# Patient Record
Sex: FEMALE | Race: WHITE | NOT HISPANIC OR LATINO | Employment: UNEMPLOYED | URBAN - METROPOLITAN AREA
[De-identification: names, ages, dates, MRNs, and addresses within clinical notes are randomized per-mention and may not be internally consistent; named-entity substitution may affect disease eponyms.]

---

## 2024-08-05 ENCOUNTER — HOSPITAL ENCOUNTER (EMERGENCY)
Facility: MEDICAL CENTER | Age: 44
End: 2024-08-05
Attending: EMERGENCY MEDICINE
Payer: MEDICARE

## 2024-08-05 ENCOUNTER — APPOINTMENT (OUTPATIENT)
Dept: RADIOLOGY | Facility: MEDICAL CENTER | Age: 44
End: 2024-08-05
Attending: EMERGENCY MEDICINE
Payer: MEDICARE

## 2024-08-05 VITALS
RESPIRATION RATE: 19 BRPM | BODY MASS INDEX: 25.69 KG/M2 | DIASTOLIC BLOOD PRESSURE: 74 MMHG | HEART RATE: 85 BPM | WEIGHT: 145 LBS | HEIGHT: 63 IN | OXYGEN SATURATION: 97 % | SYSTOLIC BLOOD PRESSURE: 130 MMHG | TEMPERATURE: 97.8 F

## 2024-08-05 DIAGNOSIS — S09.90XA CLOSED HEAD INJURY, INITIAL ENCOUNTER: ICD-10-CM
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
## 2024-08-05 PROCEDURE — 99283 EMERGENCY DEPT VISIT LOW MDM: CPT

## 2024-08-05 PROCEDURE — 72125 CT NECK SPINE W/O DYE: CPT

## 2024-08-05 PROCEDURE — 70450 CT HEAD/BRAIN W/O DYE: CPT

## 2024-08-05 PROCEDURE — 73610 X-RAY EXAM OF ANKLE: CPT | Mod: RT

## 2024-08-05 PROCEDURE — 73030 X-RAY EXAM OF SHOULDER: CPT | Mod: RT

## 2024-08-05 ASSESSMENT — FIBROSIS 4 INDEX: FIB4 SCORE: 0.29
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
## 2024-08-06 NOTE — ED NOTES
Pt escorted out by security to lobby exit, still yelling and waving arms. Pt ambulated out of the dept w/ steady gait, A&O x4 w/ all belongings
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
## 2024-08-06 NOTE — DISCHARGE INSTRUCTIONS
All of your imaging was negative. Please follow up with your primary care doctor. You can take over the counter medicine for pain

## 2024-08-06 NOTE — ED NOTES
Utilizing :  Upon trying to talk to pt about d/c and being cleared as scans are negative, pt asked where she was going. Pt advised that this RN can assist w/ a ride getting back to [facility]/ shelter. Pt became irate and hostile stating she did not want to go back there, demanding to speak to the doctor about a place to stay. Pt was told that the ERP has nothing to do with a place to stay and offered resources instead, imaging negative she is cleared for d/c.f/u / PCP and OTC for pain. Pt continued to be hostile towards this RN and stated that this RN was lying, pt advised that she was being offered help, given clothes and food, and allowed to rest long after being put up for d/c, but if she is going to be hostile then other measures such as security assistance will be required. Pt states to bring security. RN ended  call and walked out d/t pt hostile and aggressive behavior  Security contacted for assistance

## 2024-08-07 ENCOUNTER — APPOINTMENT (OUTPATIENT)
Dept: RADIOLOGY | Facility: MEDICAL CENTER | Age: 44
End: 2024-08-07
Attending: EMERGENCY MEDICINE
Payer: MEDICARE

## 2024-08-07 ENCOUNTER — PHARMACY VISIT (OUTPATIENT)
Dept: PHARMACY | Facility: MEDICAL CENTER | Age: 44
End: 2024-08-07
Payer: COMMERCIAL

## 2024-08-07 ENCOUNTER — HOSPITAL ENCOUNTER (EMERGENCY)
Facility: MEDICAL CENTER | Age: 44
End: 2024-08-07
Attending: EMERGENCY MEDICINE
Payer: MEDICARE

## 2024-08-07 VITALS
DIASTOLIC BLOOD PRESSURE: 75 MMHG | TEMPERATURE: 97.1 F | BODY MASS INDEX: 25.69 KG/M2 | RESPIRATION RATE: 16 BRPM | HEART RATE: 81 BPM | HEIGHT: 63 IN | WEIGHT: 145 LBS | SYSTOLIC BLOOD PRESSURE: 122 MMHG | OXYGEN SATURATION: 98 %

## 2024-08-07 DIAGNOSIS — S90.30XA CONTUSION OF FOOT, UNSPECIFIED LATERALITY, INITIAL ENCOUNTER: ICD-10-CM

## 2024-08-07 DIAGNOSIS — R73.9 HYPERGLYCEMIA: ICD-10-CM
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
## 2024-08-07 DIAGNOSIS — R19.7 NAUSEA VOMITING AND DIARRHEA: ICD-10-CM

## 2024-08-07 DIAGNOSIS — R11.2 NAUSEA VOMITING AND DIARRHEA: ICD-10-CM

## 2024-08-07 PROCEDURE — RXMED WILLOW AMBULATORY MEDICATION CHARGE: Performed by: EMERGENCY MEDICINE

## 2024-08-07 PROCEDURE — A9270 NON-COVERED ITEM OR SERVICE: HCPCS | Mod: UD | Performed by: EMERGENCY MEDICINE

## 2024-08-07 PROCEDURE — 700102 HCHG RX REV CODE 250 W/ 637 OVERRIDE(OP): Mod: UD | Performed by: EMERGENCY MEDICINE

## 2024-08-07 PROCEDURE — 90471 IMMUNIZATION ADMIN: CPT

## 2024-08-07 PROCEDURE — 90715 TDAP VACCINE 7 YRS/> IM: CPT | Performed by: EMERGENCY MEDICINE

## 2024-08-07 PROCEDURE — 73620 X-RAY EXAM OF FOOT: CPT | Mod: RT

## 2024-08-07 PROCEDURE — 700111 HCHG RX REV CODE 636 W/ 250 OVERRIDE (IP): Performed by: EMERGENCY MEDICINE

## 2024-08-07 PROCEDURE — 99284 EMERGENCY DEPT VISIT MOD MDM: CPT

## 2024-08-07 RX ORDER — CEPHALEXIN 500 MG/1
500 CAPSULE ORAL 4 TIMES DAILY
Qty: 28 CAPSULE | Refills: 0 | Status: ACTIVE | OUTPATIENT
Start: 2024-08-07 | End: 2024-08-14

## 2024-08-07 RX ORDER — ONDANSETRON 4 MG/1
4 TABLET, ORALLY DISINTEGRATING ORAL EVERY 6 HOURS PRN
Qty: 10 TABLET | Refills: 0 | Status: SHIPPED | OUTPATIENT
Start: 2024-08-07

## 2024-08-07 RX ORDER — CEPHALEXIN 500 MG/1
500 CAPSULE ORAL ONCE
Status: COMPLETED | OUTPATIENT
Start: 2024-08-07 | End: 2024-08-07

## 2024-08-07 RX ORDER — NAPROXEN 500 MG/1
500 TABLET ORAL 2 TIMES DAILY WITH MEALS
Qty: 60 TABLET | Refills: 0 | Status: SHIPPED | OUTPATIENT
Start: 2024-08-07 | End: 2024-08-20

## 2024-08-07 RX ADMIN — CEPHALEXIN 500 MG: 500 CAPSULE ORAL at 16:45

## 2024-08-07 RX ADMIN — CLOSTRIDIUM TETANI TOXOID ANTIGEN (FORMALDEHYDE INACTIVATED), CORYNEBACTERIUM DIPHTHERIAE TOXOID ANTIGEN (FORMALDEHYDE INACTIVATED), BORDETELLA PERTUSSIS TOXOID ANTIGEN (GLUTARALDEHYDE INACTIVATED), BORDETELLA PERTUSSIS FILAMENTOUS HEMAGGLUTININ ANTIGEN (FORMALDEHYDE INACTIVATED), BORDETELLA PERTUSSIS PERTACTIN ANTIGEN, AND BORDETELLA PERTUSSIS FIMBRIAE 2/3 ANTIGEN 0.5 ML: 5; 2; 2.5; 5; 3; 5 INJECTION, SUSPENSION INTRAMUSCULAR at 16:36

## 2024-08-07 ASSESSMENT — FIBROSIS 4 INDEX: FIB4 SCORE: 0.29
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
## 2024-08-10 ENCOUNTER — HOSPITAL ENCOUNTER (EMERGENCY)
Facility: MEDICAL CENTER | Age: 44
End: 2024-08-10
Attending: EMERGENCY MEDICINE
Payer: MEDICARE

## 2024-08-10 VITALS
TEMPERATURE: 97 F | HEIGHT: 63 IN | BODY MASS INDEX: 25.69 KG/M2 | WEIGHT: 145 LBS | HEART RATE: 78 BPM | SYSTOLIC BLOOD PRESSURE: 121 MMHG | RESPIRATION RATE: 16 BRPM | OXYGEN SATURATION: 96 % | DIASTOLIC BLOOD PRESSURE: 77 MMHG

## 2024-08-10 DIAGNOSIS — Z59.00 HOMELESS: ICD-10-CM

## 2024-08-10 DIAGNOSIS — F69 BEHAVIOR PROBLEM, ADULT: ICD-10-CM

## 2024-08-10 PROCEDURE — 99283 EMERGENCY DEPT VISIT LOW MDM: CPT

## 2024-08-10 SDOH — ECONOMIC STABILITY - HOUSING INSECURITY: HOMELESSNESS UNSPECIFIED: Z59.00

## 2024-08-10 ASSESSMENT — LIFESTYLE VARIABLES
TOTAL SCORE: 0
HAVE YOU EVER FELT YOU SHOULD CUT DOWN ON YOUR DRINKING: NO
CONSUMPTION TOTAL: INCOMPLETE
DO YOU DRINK ALCOHOL: NO
HAVE PEOPLE ANNOYED YOU BY CRITICIZING YOUR DRINKING: NO
EVER HAD A DRINK FIRST THING IN THE MORNING TO STEADY YOUR NERVES TO GET RID OF A HANGOVER: NO
EVER FELT BAD OR GUILTY ABOUT YOUR DRINKING: NO

## 2024-08-10 ASSESSMENT — FIBROSIS 4 INDEX: FIB4 SCORE: 0.29
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
## 2024-08-12 ENCOUNTER — HOSPITAL ENCOUNTER (EMERGENCY)
Facility: MEDICAL CENTER | Age: 44
End: 2024-08-13
Attending: EMERGENCY MEDICINE
Payer: MEDICARE

## 2024-08-12 DIAGNOSIS — M79.672 PAIN IN BOTH FEET: ICD-10-CM

## 2024-08-12 DIAGNOSIS — M79.671 PAIN IN BOTH FEET: ICD-10-CM

## 2024-08-12 DIAGNOSIS — S90.822A FRICTION BLISTERS OF SOLE OF LEFT FOOT, INITIAL ENCOUNTER: ICD-10-CM

## 2024-08-12 PROCEDURE — 99283 EMERGENCY DEPT VISIT LOW MDM: CPT

## 2024-08-12 PROCEDURE — 700102 HCHG RX REV CODE 250 W/ 637 OVERRIDE(OP): Mod: UD | Performed by: EMERGENCY MEDICINE

## 2024-08-12 PROCEDURE — A9270 NON-COVERED ITEM OR SERVICE: HCPCS | Mod: UD | Performed by: EMERGENCY MEDICINE

## 2024-08-12 PROCEDURE — RXMED WILLOW AMBULATORY MEDICATION CHARGE: Performed by: EMERGENCY MEDICINE

## 2024-08-12 RX ORDER — HYDROCODONE BITARTRATE AND ACETAMINOPHEN 5; 325 MG/1; MG/1
1 TABLET ORAL EVERY 4 HOURS PRN
Qty: 15 TABLET | Refills: 0 | Status: SHIPPED | OUTPATIENT
Start: 2024-08-12 | End: 2024-08-18

## 2024-08-12 RX ORDER — HYDROCODONE BITARTRATE AND ACETAMINOPHEN 5; 325 MG/1; MG/1
1 TABLET ORAL ONCE
Status: COMPLETED | OUTPATIENT
Start: 2024-08-12 | End: 2024-08-12

## 2024-08-12 RX ADMIN — HYDROCODONE BITARTRATE AND ACETAMINOPHEN 1 TABLET: 5; 325 TABLET ORAL at 22:42

## 2024-08-12 ASSESSMENT — FIBROSIS 4 INDEX: FIB4 SCORE: 0.29

## 2024-08-13 ENCOUNTER — PHARMACY VISIT (OUTPATIENT)
Dept: PHARMACY | Facility: MEDICAL CENTER | Age: 44
End: 2024-08-13
Payer: COMMERCIAL

## 2024-08-13 VITALS
DIASTOLIC BLOOD PRESSURE: 75 MMHG | WEIGHT: 130.07 LBS | SYSTOLIC BLOOD PRESSURE: 120 MMHG | HEIGHT: 63 IN | BODY MASS INDEX: 23.05 KG/M2 | OXYGEN SATURATION: 94 % | HEART RATE: 75 BPM | RESPIRATION RATE: 20 BRPM | TEMPERATURE: 97.7 F

## 2024-08-13 ASSESSMENT — PAIN DESCRIPTION - PAIN TYPE: TYPE: ACUTE PAIN

## 2024-08-13 NOTE — ED NOTES
Controlled substance discussed with client. Client agrees to abide by controlled substance contract.

## 2024-08-13 NOTE — ED PROVIDER NOTES
ER Provider Note    Scribed for Isaac Hubbard M.D. by Melany Oacmpo. 8/12/2024   10:26 PM    Primary Care Provider: NARCISO Barber    CHIEF COMPLAINT  Chief Complaint   Patient presents with    Foot Pain     BIB [facility] from [facility]. Complains of bilateral foot pain. Blister on the left foot. Reports wears shoe sometimes.      EXTERNAL RECORDS REVIEWED  Outpatient Notes Review of records show the patient was seen three times this month 08/2024, two times for foot pain and one time most recently for a blister. Patient is hearing impaired and homeless.    HPI/ROS  LIMITATION TO HISTORY   Select: Language ASL, Text Used   OUTSIDE HISTORIAN(S):  None.    Judith Curiel is a 44 y.o. female who presents to the ED for evaluation of foot pain onset a few hours prior to arrival. The patient reports she has bilateral foot pain, with a blister on her left foot. She states that she wears shoes sometimes. She notes the blister appeared prior to obtaining her new shoes one day ago. Denies use of Norco or Percocet prior.    PAST MEDICAL HISTORY  Past Medical History:   Diagnosis Date    Deaf     Diabetes (HCC)        SURGICAL HISTORY  Past Surgical History:   Procedure Laterality Date    NO PERTINENT PAST SURGICAL HISTORY         FAMILY HISTORY  No family history on pertinent for review.    SOCIAL HISTORY   reports that she has been smoking cigarettes. She has never used smokeless tobacco. She reports that she does not drink alcohol and does not use drugs.    CURRENT MEDICATIONS  Previous Medications    CEPHALEXIN (KEFLEX) 500 MG CAP    Take 1 Capsule by mouth 4 times a day for 7 days.    METFORMIN (GLUCOPHAGE) 500 MG TAB    Take 1 Tablet by mouth 2 times a day with meals.    NAPROXEN (NAPROSYN) 500 MG TAB    Take 1 Tablet by mouth 2 times a day with meals.    ONDANSETRON (ZOFRAN ODT) 4 MG TABLET DISPERSIBLE    Take 1 Tablet by mouth every 6 hours as needed for Nausea/Vomiting.       ALLERGIES  No Known Allergies  "    PHYSICAL EXAM  /84   Pulse 90   Temp 36.4 °C (97.5 °F) (Temporal)   Resp 18   Ht 1.6 m (5' 3\")   Wt 59 kg (130 lb 1.1 oz)   LMP 08/05/2024 (Approximate)   SpO2 100%   BMI 23.04 kg/m²    Nursing note and vitals reviewed.  Constitutional: Well-developed and well-nourished. No distress.   HENT: Head is normocephalic and atraumatic. Oropharynx is clear and moist without exudate or erythema.   Eyes: Pupils are equal, round, and reactive to light. Conjunctiva are normal.   Cardiovascular: Normal rate and regular rhythm. No murmur heard. Normal radial pulses.  Pulmonary/Chest: Breath sounds normal. No wheezes or rales.   Abdominal: Soft and non-tender. No distention    Musculoskeletal: Extremities exhibit normal range of motion without edema or tenderness.   Neurological: Awake, alert and oriented to person, place, and time. No focal deficits noted.  Skin: Skin is warm and dry. No rash. Friction blister on arch of left foot. No evidence of infection.  Psychiatric: Normal mood and affect. Appropriate for clinical situation    INITIAL ASSESSMENT AND PLAN    10:26 PM - Patient was evaluated at bedside. The patient will be medicated with Norco 5-325 mg per 1 tablet for her symptoms. Patient verbalizes understanding and support with my plan of care.         COURSE AND MEDICAL DECISION MAKING    10:46 PM -  I reevaluated the patient at bedside. The patient informs me they feel improved following Norco administration. I discussed plan for discharge and follow up as outlined below. The patient is stable for discharge at this time and will return for any new or worsening symptoms. Patient verbalizes understanding and support with my plan for discharge.     DISPOSITION AND DISCUSSIONS    I have discussed management of the patient with the following physicians and FLORIDALMA's:  None.    Discussion of management with other QHP or appropriate source(s): None     Barriers to care at this time, including but not limited to: " Patient is homeless.     I considered prescribing antibiotics, however I have not identified a bacterial source of infection.    I reviewed prescription monitoring program for patient's narcotic use before prescribing a scheduled drug.The patient will not drink alcohol nor drive with prescribed medications. The patient will return for new or worsening symptoms and is stable at the time of discharge.    The patient is referred to a primary physician for blood pressure management, diabetic screening, and for all other preventative health concerns.    In prescribing controlled substances to this patient, I certify that I have obtained and reviewed the medical history of Judith Curiel. I have also made a good grupo effort to obtain applicable records from other providers who have treated the patient and records did not demonstrate any increased risk of substance abuse that would prevent me from prescribing controlled substances.     I have conducted a physical exam and documented it. I have reviewed Ms. Curiel’s prescription history as maintained by the [place] Prescription Monitoring Program.     I have assessed the patient’s risk for abuse, dependency, and addiction using the validated Opioid Risk Tool available at https://www.mdcalc.com/zpyuyf-kbee-arrm-ort-narcotic-abuse.     Given the above, I believe the benefits of controlled substance therapy outweigh the risks. The reasons for prescribing controlled substances include non-narcotic, oral analgesic alternatives have been inadequate for pain control. Accordingly, I have discussed the risk and benefits, treatment plan, and alternative therapies with the patient.     DISPOSITION:  Patient will be discharged home in stable condition.    FOLLOW UP:  NARCISO Barber  [address]  [facility] [address]  [phone]    Schedule an appointment as soon as possible for a visit       [facility], Emergency Dept  [address]  [place] [address]  [phone]    If symptoms worsen      OUTPATIENT MEDICATIONS:  New Prescriptions    HYDROCODONE-ACETAMINOPHEN (NORCO) 5-325 MG TAB PER TABLET    Take 1 Tablet by mouth every four hours as needed (pain) for up to 5 days.     FINAL DIAGNOSIS  1. Pain in both feet    2. Friction blisters of sole of left foot, initial encounter       Melany BOSE ([place]), am scribing for, and in the presence of, Isaac Hubbard M.D..    Electronically signed by: Melany Ocampo (Scribe), 8/12/2024    IIsaac M.D. personally performed the services described in this documentation, as scribed by Melany Ocampo in my presence, and it is both accurate and complete.      The note accurately reflects work and decisions made by me.  Isaac Hubbard M.D.  8/12/2024  11:36 PM

## 2024-08-13 NOTE — ED TRIAGE NOTES
Judith Curiel  44 y.o. female  Chief Complaint   Patient presents with    Foot Pain     BIB [facility] from [facility]. Complains of bilateral foot pain. Blister on the left foot. Reports wears shoe sometimes.      Pt ambulatory to triage with steady gait for above complaint. Patient is deaf. Able to communicate thrhu American sign language or texting.    Pt is GCS 15, follows commands and responds appropriately to questions. Resp are even and unlabored.     Pt placed in ED lobby. Pt educated on triage process. Pt encouraged to alert staff for any changes.       Vitals:    08/12/24 2111   BP: 126/84   Pulse: 90   Resp: 18   Temp: 36.4 °C (97.5 °F)   SpO2: 100%      [Mother] : mother

## 2024-08-13 NOTE — ED NOTES
This RN booked a ride for pt via [facility].    Discharge instructions given and discussed, signed copy in chart. Pt verbalized understanding and all questions answered. Copy of prescriptions given to pt. Pt discharged in stable condition on room air. Personal belongings given to patient.

## 2024-08-14 ENCOUNTER — HOSPITAL ENCOUNTER (EMERGENCY)
Facility: MEDICAL CENTER | Age: 44
End: 2024-08-14
Attending: EMERGENCY MEDICINE
Payer: MEDICARE

## 2024-08-14 VITALS
HEART RATE: 85 BPM | SYSTOLIC BLOOD PRESSURE: 126 MMHG | TEMPERATURE: 97.7 F | DIASTOLIC BLOOD PRESSURE: 81 MMHG | HEIGHT: 63 IN | BODY MASS INDEX: 22.73 KG/M2 | RESPIRATION RATE: 20 BRPM | WEIGHT: 128.31 LBS | OXYGEN SATURATION: 98 %

## 2024-08-14 DIAGNOSIS — T14.8XXA BLISTER: ICD-10-CM

## 2024-08-14 PROCEDURE — 97597 DBRDMT OPN WND 1ST 20 CM/<: CPT | Mod: XU

## 2024-08-14 PROCEDURE — 99283 EMERGENCY DEPT VISIT LOW MDM: CPT

## 2024-08-14 PROCEDURE — 99406 BEHAV CHNG SMOKING 3-10 MIN: CPT

## 2024-08-14 PROCEDURE — 303977 HCHG I & D

## 2024-08-14 ASSESSMENT — PAIN DESCRIPTION - DESCRIPTORS: DESCRIPTORS: BURNING

## 2024-08-14 ASSESSMENT — PAIN DESCRIPTION - PAIN TYPE: TYPE: ACUTE PAIN

## 2024-08-14 ASSESSMENT — FIBROSIS 4 INDEX: FIB4 SCORE: 0.29

## 2024-08-14 NOTE — ED PROVIDER NOTES
"ED Provider Note    CHIEF COMPLAINT  Chief Complaint   Patient presents with    Foot Pain     blisters       HPI/ROS      Judith Curiel is a 44 y.o. female who presents with a blister to the left inner aspect of her heel.  The patient's had this for several days.  She also has an open wound to the right foot that seems to be healing.  She is currently on Keflex.  She is homeless.  She also admits to tobacco abuse as well as diabetes.    PAST MEDICAL HISTORY   has a past medical history of Deaf and Diabetes (HCC).    SURGICAL HISTORY   has a past surgical history that includes no pertinent past surgical history.    FAMILY HISTORY  History reviewed. No pertinent family history.    SOCIAL HISTORY  Social History     Tobacco Use    Smoking status: Every Day     Current packs/day: 1.00     Types: Cigarettes    Smokeless tobacco: Never   Vaping Use    Vaping status: Never Used   Substance and Sexual Activity    Alcohol use: No    Drug use: Yes     Comment: meth last use june 2024    Sexual activity: Not on file       CURRENT MEDICATIONS  Home Medications    **Home medications have not yet been reviewed for this encounter**         ALLERGIES  No Known Allergies    PHYSICAL EXAM  VITAL SIGNS: /83   Pulse 100   Temp 36.7 °C (98 °F) (Temporal)   Resp 20   Ht 1.6 m (5' 3\")   Wt 58.2 kg (128 lb 4.9 oz)   LMP 08/05/2024 (Approximate)   SpO2 95%   BMI 22.73 kg/m²    In general the patient is unkempt but does not appear toxic    Extremities patient does have a blister that extends from the bottom of the calcaneus medially with no significant surrounding erythema nor induration.  She also has a small open wound to the right foot from a prior blister with no surrounding erythema nor induration    Skin as mentioned above    Neuro vas examination is grossly intact to the lower extremities      PROCEDURE left foot blister debridement  Secondary to location of the blister as well as the size we did opt for " debridement as would cause continued pressure and expand with ambulation.  Therefore I used a #11 blade initially to incise into the blister with serous drainage.  I then removed the overlying skin with scissors.  Antibiotic ointment and a dressing was applied.      COURSE & MEDICAL DECISION MAKING    This a 44-year-old female who presents with a large blister to the left foot.  This was drained and debrided.  Secondary to the size as mentioned above the patient have continued expansion with ambulation.  Antibiotic ointment and a dressing was applied and the patient will receive supplies for dressing changes every 24 hours for the next 2 days.  I like the patient to recheck with her primary care doctor or the emergency department in 48 hours.  She is currently on Keflex.    I did have a good discussion with the patient regarding the need for smoking cessation.      FINAL DIAGNOSIS  1.  Blister left foot with debridement  2. Jose Whelan M.D. spent greater than 3 minutes with the patient explaining the importance of smoking cessation.   3.  Homelessness    Disposition  The patient will be discharged in stable condition     Electronically signed by: Jose Whelan M.D., 8/14/2024 5:35 AM

## 2024-08-14 NOTE — ED NOTES
This RN called [facility] to notify pt will be returning and spoke with [facility] staff confirmed bed availability for pt

## 2024-08-14 NOTE — ED TRIAGE NOTES
"Chief Complaint   Patient presents with    Foot Pain     blisters     Pt arrives with complaint of bilateral feet pain x 1 week. Pt is normally ambulatory and has developed blisters. Right back of heel small abrasion noted with redness. Left foot with large fluid filled blister with redness noted. PMS intact to BLE. Pt aox4, skin warm and dry, airway patent, rr even and unlabored. Interpretor # [id number] used for american sign language.    /83   Pulse 100   Temp 36.7 °C (98 °F) (Temporal)   Resp 20   Ht 1.6 m (5' 3\")   Wt 58.2 kg (128 lb 4.9 oz)   LMP 08/05/2024 (Approximate)   SpO2 95%   BMI 22.73 kg/m²     Pt updated on rooming process and to notify staff for worsening condition. Pt sent back to lobby until a room is available.    "

## 2024-08-14 NOTE — DISCHARGE INSTRUCTIONS
Continue the antibiotics.  Recheck with your primary care doctor here in the Emergency Department in 48 to 72 hours.  Change the dressing daily as mentioned.    Refrain from further tobacco abuse.

## 2024-08-14 NOTE — ED NOTES
This RN booked pt's ride back to [facility] via [facility]. Pt transported to [facility] via

## 2024-08-14 NOTE — ED NOTES
Discharge instructions given and discussed, signed copy in chart. Pt verbalized understanding and all questions answered. Pt discharged in stable condition on room air. Personal belongings given to patient.

## 2024-08-20 ENCOUNTER — HOSPITAL ENCOUNTER (EMERGENCY)
Facility: MEDICAL CENTER | Age: 44
End: 2024-08-21
Attending: EMERGENCY MEDICINE
Payer: MEDICARE

## 2024-08-20 VITALS
SYSTOLIC BLOOD PRESSURE: 136 MMHG | HEART RATE: 90 BPM | WEIGHT: 128 LBS | OXYGEN SATURATION: 99 % | RESPIRATION RATE: 19 BRPM | TEMPERATURE: 98.5 F | BODY MASS INDEX: 22.68 KG/M2 | HEIGHT: 63 IN | DIASTOLIC BLOOD PRESSURE: 65 MMHG
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
## 2024-08-20 DIAGNOSIS — T14.8XXA CHRONIC WOUND: ICD-10-CM

## 2024-08-20 DIAGNOSIS — R73.9 HYPERGLYCEMIA: ICD-10-CM

## 2024-08-20 LAB
ANION GAP SERPL CALC-SCNC: 14 MMOL/L (ref 7–16)
BASOPHILS # BLD AUTO: 0.1 % (ref 0–1.8)
BASOPHILS # BLD: 0.01 K/UL (ref 0–0.12)
BUN SERPL-MCNC: 15 MG/DL (ref 8–22)
CALCIUM SERPL-MCNC: 8.8 MG/DL (ref 8.5–10.5)
CHLORIDE SERPL-SCNC: 100 MMOL/L (ref 96–112)
CO2 SERPL-SCNC: 20 MMOL/L (ref 20–33)
CREAT SERPL-MCNC: 0.87 MG/DL (ref 0.5–1.4)
EOSINOPHIL # BLD AUTO: 0.06 K/UL (ref 0–0.51)
EOSINOPHIL NFR BLD: 0.8 % (ref 0–6.9)
ERYTHROCYTE [DISTWIDTH] IN BLOOD BY AUTOMATED COUNT: 39.6 FL (ref 35.9–50)
GFR SERPLBLD CREATININE-BSD FMLA CKD-EPI: 85 ML/MIN/1.73 M 2
GLUCOSE SERPL-MCNC: 349 MG/DL (ref 65–99)
HCT VFR BLD AUTO: 35.6 % (ref 37–47)
HGB BLD-MCNC: 11.6 G/DL (ref 12–16)
IMM GRANULOCYTES # BLD AUTO: 0.02 K/UL (ref 0–0.11)
IMM GRANULOCYTES NFR BLD AUTO: 0.3 % (ref 0–0.9)
LYMPHOCYTES # BLD AUTO: 1.45 K/UL (ref 1–4.8)
LYMPHOCYTES NFR BLD: 20 % (ref 22–41)
MCH RBC QN AUTO: 27.4 PG (ref 27–33)
MCHC RBC AUTO-ENTMCNC: 32.6 G/DL (ref 32.2–35.5)
MCV RBC AUTO: 84 FL (ref 81.4–97.8)
MONOCYTES # BLD AUTO: 0.5 K/UL (ref 0–0.85)
MONOCYTES NFR BLD AUTO: 6.9 % (ref 0–13.4)
NEUTROPHILS # BLD AUTO: 5.21 K/UL (ref 1.82–7.42)
NEUTROPHILS NFR BLD: 71.9 % (ref 44–72)
NRBC # BLD AUTO: 0 K/UL
NRBC BLD-RTO: 0 /100 WBC (ref 0–0.2)
PLATELET # BLD AUTO: 272 K/UL (ref 164–446)
PMV BLD AUTO: 11.6 FL (ref 9–12.9)
POTASSIUM SERPL-SCNC: 3.8 MMOL/L (ref 3.6–5.5)
RBC # BLD AUTO: 4.24 M/UL (ref 4.2–5.4)
SODIUM SERPL-SCNC: 134 MMOL/L (ref 135–145)
WBC # BLD AUTO: 7.3 K/UL (ref 4.8–10.8)

## 2024-08-20 PROCEDURE — 99284 EMERGENCY DEPT VISIT MOD MDM: CPT

## 2024-08-20 PROCEDURE — 700105 HCHG RX REV CODE 258: Mod: UD | Performed by: EMERGENCY MEDICINE

## 2024-08-20 PROCEDURE — 700102 HCHG RX REV CODE 250 W/ 637 OVERRIDE(OP): Mod: UD | Performed by: EMERGENCY MEDICINE

## 2024-08-20 PROCEDURE — A9270 NON-COVERED ITEM OR SERVICE: HCPCS | Mod: UD | Performed by: EMERGENCY MEDICINE

## 2024-08-20 PROCEDURE — 36415 COLL VENOUS BLD VENIPUNCTURE: CPT

## 2024-08-20 PROCEDURE — 85025 COMPLETE CBC W/AUTO DIFF WBC: CPT

## 2024-08-20 PROCEDURE — 80048 BASIC METABOLIC PNL TOTAL CA: CPT

## 2024-08-20 RX ORDER — SODIUM CHLORIDE, SODIUM LACTATE, POTASSIUM CHLORIDE, CALCIUM CHLORIDE 600; 310; 30; 20 MG/100ML; MG/100ML; MG/100ML; MG/100ML
1000 INJECTION, SOLUTION INTRAVENOUS ONCE
Status: COMPLETED | OUTPATIENT
Start: 2024-08-20 | End: 2024-08-20

## 2024-08-20 RX ORDER — ACETAMINOPHEN 500 MG
1000 TABLET ORAL ONCE
Status: COMPLETED | OUTPATIENT
Start: 2024-08-20 | End: 2024-08-20

## 2024-08-20 RX ORDER — NAPROXEN 500 MG/1
500 TABLET ORAL 2 TIMES DAILY WITH MEALS
Qty: 60 TABLET | Refills: 1 | Status: SHIPPED | OUTPATIENT
Start: 2024-08-20

## 2024-08-20 RX ADMIN — IBUPROFEN 800 MG: 200 TABLET, FILM COATED ORAL at 22:07

## 2024-08-20 RX ADMIN — SODIUM CHLORIDE, POTASSIUM CHLORIDE, SODIUM LACTATE AND CALCIUM CHLORIDE 1000 ML: 600; 310; 30; 20 INJECTION, SOLUTION INTRAVENOUS at 22:10

## 2024-08-20 RX ADMIN — ACETAMINOPHEN 1000 MG: 500 TABLET ORAL at 22:07

## 2024-08-20 ASSESSMENT — FIBROSIS 4 INDEX: FIB4 SCORE: 0.29

## 2024-08-21 ENCOUNTER — PHARMACY VISIT (OUTPATIENT)
Dept: PHARMACY | Facility: MEDICAL CENTER | Age: 44
End: 2024-08-21
Payer: COMMERCIAL

## 2024-08-21 PROCEDURE — RXMED WILLOW AMBULATORY MEDICATION CHARGE: Performed by: EMERGENCY MEDICINE

## 2024-08-21 NOTE — ED NOTES
Pt discharged to home. Pt was given follow up instructions and prescriptions. Pt verbalized understanding of all instructions for discharge and is ambulatory out of ED with steady gait. AOx4
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
## 2024-08-21 NOTE — DISCHARGE INSTRUCTIONS
Your prescriptions were sent upstairs for pain and for your diabetes   There is not reason for admission or antibiotics today
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
## 2024-08-24 ENCOUNTER — HOSPITAL ENCOUNTER (EMERGENCY)
Facility: MEDICAL CENTER | Age: 44
End: 2024-08-24
Attending: EMERGENCY MEDICINE
Payer: MEDICARE

## 2024-08-24 VITALS
HEART RATE: 78 BPM | RESPIRATION RATE: 18 BRPM | SYSTOLIC BLOOD PRESSURE: 118 MMHG | DIASTOLIC BLOOD PRESSURE: 69 MMHG | WEIGHT: 128 LBS | TEMPERATURE: 98.4 F | BODY MASS INDEX: 22.68 KG/M2 | OXYGEN SATURATION: 99 % | HEIGHT: 63 IN

## 2024-08-24 DIAGNOSIS — Z51.89 ENCOUNTER FOR WOUND RE-CHECK: ICD-10-CM

## 2024-08-24 PROCEDURE — 700101 HCHG RX REV CODE 250: Mod: UD | Performed by: EMERGENCY MEDICINE

## 2024-08-24 PROCEDURE — 99283 EMERGENCY DEPT VISIT LOW MDM: CPT

## 2024-08-24 RX ORDER — MUPIROCIN 20 MG/G
OINTMENT TOPICAL 2 TIMES DAILY
Status: DISCONTINUED | OUTPATIENT
Start: 2024-08-24 | End: 2024-08-24 | Stop reason: HOSPADM

## 2024-08-24 RX ORDER — MUPIROCIN 20 MG/G
1 OINTMENT TOPICAL 2 TIMES DAILY
Qty: 22 G | Refills: 0 | Status: SHIPPED | OUTPATIENT
Start: 2024-08-24

## 2024-08-24 RX ADMIN — MUPIROCIN 2 %: 20 OINTMENT TOPICAL at 18:00

## 2024-08-24 ASSESSMENT — FIBROSIS 4 INDEX: FIB4 SCORE: 0.32

## 2024-08-24 NOTE — ED TRIAGE NOTES
Chief Complaint   Patient presents with    Wound Check     Pt bib [facility]sa from [facility] reports wound to left foot x 2 weeks. States pain worsen today. Has prescription of naproxen for pain but ems states that still has full bottle. Denies fever, chills. Pt hard of hearing. Able to read lip. Has bgl of 284
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
## 2024-08-25 NOTE — ED NOTES
Pt discharged home, discharge and follow up care instructions given, prescriptions sent to the pharmacy of choice, pt verbalized understanding.  pt ambulated out of ED independently.

## 2024-08-27 ENCOUNTER — PHARMACY VISIT (OUTPATIENT)
Dept: PHARMACY | Facility: MEDICAL CENTER | Age: 44
End: 2024-08-27
Payer: COMMERCIAL

## 2024-08-27 ENCOUNTER — HOSPITAL ENCOUNTER (EMERGENCY)
Facility: MEDICAL CENTER | Age: 44
End: 2024-08-27
Attending: EMERGENCY MEDICINE
Payer: MEDICARE

## 2024-08-27 ENCOUNTER — APPOINTMENT (OUTPATIENT)
Dept: RADIOLOGY | Facility: MEDICAL CENTER | Age: 44
End: 2024-08-27
Attending: EMERGENCY MEDICINE
Payer: MEDICARE

## 2024-08-27 VITALS
BODY MASS INDEX: 22.7 KG/M2 | RESPIRATION RATE: 16 BRPM | SYSTOLIC BLOOD PRESSURE: 123 MMHG | WEIGHT: 128.09 LBS | HEART RATE: 72 BPM | DIASTOLIC BLOOD PRESSURE: 70 MMHG | TEMPERATURE: 98 F | HEIGHT: 63 IN | OXYGEN SATURATION: 98 %

## 2024-08-27 DIAGNOSIS — L97.529 ULCER OF LEFT FOOT, UNSPECIFIED ULCER STAGE (HCC): ICD-10-CM

## 2024-08-27 DIAGNOSIS — L03.116 CELLULITIS OF LEFT FOOT: ICD-10-CM

## 2024-08-27 PROCEDURE — RXMED WILLOW AMBULATORY MEDICATION CHARGE: Performed by: EMERGENCY MEDICINE

## 2024-08-27 PROCEDURE — 99284 EMERGENCY DEPT VISIT MOD MDM: CPT

## 2024-08-27 PROCEDURE — A9270 NON-COVERED ITEM OR SERVICE: HCPCS | Mod: UD | Performed by: EMERGENCY MEDICINE

## 2024-08-27 PROCEDURE — 700102 HCHG RX REV CODE 250 W/ 637 OVERRIDE(OP): Mod: UD | Performed by: EMERGENCY MEDICINE

## 2024-08-27 PROCEDURE — 73630 X-RAY EXAM OF FOOT: CPT | Mod: LT

## 2024-08-27 RX ORDER — CEPHALEXIN 500 MG/1
1000 CAPSULE ORAL 2 TIMES DAILY
Qty: 28 CAPSULE | Refills: 0 | Status: ACTIVE | OUTPATIENT
Start: 2024-08-27 | End: 2024-09-03

## 2024-08-27 RX ORDER — OXYCODONE HYDROCHLORIDE 5 MG/1
5 TABLET ORAL ONCE
Status: COMPLETED | OUTPATIENT
Start: 2024-08-27 | End: 2024-08-27

## 2024-08-27 RX ORDER — SULFAMETHOXAZOLE/TRIMETHOPRIM 800-160 MG
1 TABLET ORAL 2 TIMES DAILY
Qty: 14 TABLET | Refills: 0 | Status: ACTIVE | OUTPATIENT
Start: 2024-08-27 | End: 2024-09-03

## 2024-08-27 RX ORDER — SULFAMETHOXAZOLE/TRIMETHOPRIM 800-160 MG
1 TABLET ORAL ONCE
Status: COMPLETED | OUTPATIENT
Start: 2024-08-27 | End: 2024-08-27

## 2024-08-27 RX ORDER — CEPHALEXIN 500 MG/1
500 CAPSULE ORAL ONCE
Status: COMPLETED | OUTPATIENT
Start: 2024-08-27 | End: 2024-08-27

## 2024-08-27 RX ORDER — ACETAMINOPHEN 500 MG
1000 TABLET ORAL ONCE
Status: COMPLETED | OUTPATIENT
Start: 2024-08-27 | End: 2024-08-27

## 2024-08-27 RX ADMIN — ACETAMINOPHEN 1000 MG: 500 TABLET ORAL at 17:09

## 2024-08-27 RX ADMIN — SULFAMETHOXAZOLE AND TRIMETHOPRIM 1 TABLET: 800; 160 TABLET ORAL at 17:09

## 2024-08-27 RX ADMIN — CEPHALEXIN 500 MG: 500 CAPSULE ORAL at 17:09

## 2024-08-27 RX ADMIN — OXYCODONE HYDROCHLORIDE 5 MG: 5 TABLET ORAL at 17:09

## 2024-08-27 ASSESSMENT — FIBROSIS 4 INDEX: FIB4 SCORE: 0.32

## 2024-08-27 NOTE — ED NOTES
Pt brought back to room [address] via wheelchair. C/C is Wound Check on her Left Foot. Pt is on the monitor and call light within reach. Chart up for ERP.

## 2024-08-27 NOTE — ED NOTES
RN agree with triage. Wound to L foot, no drainage or blood noted. Appears to be a blister to R foot.  On monitor. Call light in reach. Denies fevers

## 2024-08-27 NOTE — ED TRIAGE NOTES
Chief Complaint   Patient presents with    Wound Check     Blister/wound to side of left foot. Patient reports she was walking today when it opened and started bleeding. Patient has a closed wound to right foot also.

## 2024-08-27 NOTE — ED PROVIDER NOTES
ED Provider Note    CHIEF COMPLAINT  Chief Complaint   Patient presents with    Wound Check     Blister/wound to side of left foot. Patient reports she was walking today when it opened and started bleeding. Patient has a closed wound to right foot also.        EXTERNAL RECORDS REVIEWED  None    HPI/ROS  LIMITATION TO HISTORY   Select: Language ASL,  Used   OUTSIDE HISTORIAN(S):  None      Judith Curiel is a 44 y.o. female who presents to the ER for 1 week of worsening left heel redness, pain, drainage.  No fevers or chills.  She is still able to walk on it.  She has not sure how this started.  She has been taking naproxen for the pain which is only mildly helping.  Does have a history of diabetes    PAST MEDICAL HISTORY   has a past medical history of Deaf and Diabetes (HCC).    SURGICAL HISTORY   has a past surgical history that includes no pertinent past surgical history.    FAMILY HISTORY  History reviewed. No pertinent family history.    SOCIAL HISTORY  Social History     Tobacco Use    Smoking status: Every Day     Current packs/day: 1.00     Types: Cigarettes    Smokeless tobacco: Never   Vaping Use    Vaping status: Never Used   Substance and Sexual Activity    Alcohol use: No    Drug use: Yes     Comment: meth last use june 2024    Sexual activity: Not on file       CURRENT MEDICATIONS  Home Medications       Reviewed by Salma Smith R.N. (Registered Nurse) on 08/27/24 at 1549  Med List Status: Partial     Medication Last Dose Status   metFORMIN (GLUCOPHAGE) 500 MG Tab  Active   mupirocin (BACTROBAN) 2 % Ointment  Active   naproxen (NAPROSYN) 500 MG Tab  Active   ondansetron (ZOFRAN ODT) 4 MG TABLET DISPERSIBLE  Active                  Audit from Redirected Encounters    **Home medications have not yet been reviewed for this encounter**         ALLERGIES  No Known Allergies    PHYSICAL EXAM  VITAL SIGNS: /80   Pulse 90   Temp 36.6 °C (97.8 °F) (Temporal)   Resp 18   Ht 1.6 m (5'  "3\")   Wt 58.1 kg (128 lb 1.4 oz)   LMP 08/05/2024 (Approximate)   SpO2 99%   BMI 22.69 kg/m²    General: Sitting in stretcher comfortably, no distress  CV: Regular rate and rhythm no murmurs  Pulmonary: Breathing comfortably on room air  Abdomen: Soft nondistended nontender  MSK: Ulceration to the left medial heel with surrounding erythema and serous fluid within a blister but no purulence.  Additional flaccid bullae to the left posterior distal leg without any surrounding erythema, no tenderness        RADIOLOGY/PROCEDURES   I have independently interpreted the diagnostic imaging associated with this visit and am waiting the final reading from the radiologist.   My preliminary interpretation is as follows: No evidence of osteomyelitis    Radiologist interpretation:  DX-FOOT-COMPLETE 3+ LEFT   Final Result         1.  No radiographic evidence of acute injury.          COURSE & MEDICAL DECISION MAKING    ASSESSMENT, COURSE AND PLAN  Care Narrative: Differential includes diabetic foot ulcer, osteomyelitis, abscess, cellulitis, foreign body    Vitals on arrival all within normal limits, no evidence of sepsis.  She has an ulcer on the left medial heel surrounded by erythema with some serous fluid underneath the blister and also has a flaccid serous bullae on the right posterior leg.  No evidence of sepsis.  Differential above considered.  We will give cephalexin and Bactrim prophylactically, treat with Tylenol and oxycodone for pain.  X-rays were obtained which fortunately showed no osseous involvement.  I suspect she has a diabetic foot ulcer and early cellulitis which should be amenable to outpatient management to start.  I gave her a 1 week prescription of cephalexin and Bactrim and referred her to the wound clinic.  Return precautions and home care were provided and she was discharged home in stable condition.          ADDITIONAL PROBLEMS MANAGED  None    DISPOSITION AND DISCUSSIONS  I have discussed management " of the patient with the following physicians and FLORIDALMA's: None    Discussion of management with other QHP or appropriate source(s): None     Escalation of care considered, and ultimately not performed:Laboratory analysis    Barriers to care at this time, including but not limited to: Patient is homeless.     Decision tools and prescription drugs considered including, but not limited to: Antibiotics cephalexin and Bactrim .    FINAL DIAGNOSIS  1. Ulcer of left foot, unspecified ulcer stage (HCC)    2. Cellulitis of left foot         Electronically signed by: Oliver Decker M.D., 8/27/2024 4:48 PM

## 2024-08-28 NOTE — DISCHARGE INSTRUCTIONS
Start taking your antibiotics tomorrow and finish them completely.  Follow-up with the wound clinic to make sure your wound heals appropriately.  Come back if you think the infection is getting worse.

## 2024-08-28 NOTE — ED NOTES
Discharge education provided. Discharge paperwork reviewed with pt. Prescription picked up and provided to patient. All questions answered. All belongings with pt. Pt ambulated to lobby unassisted with steady gait for [facility] transport home.      used for all interactions.

## 2024-09-19 ENCOUNTER — OFFICE VISIT (OUTPATIENT)
Dept: WOUND CARE | Facility: MEDICAL CENTER | Age: 44
End: 2024-09-19
Attending: NURSE PRACTITIONER
Payer: MEDICARE

## 2024-09-19 VITALS
HEART RATE: 82 BPM | SYSTOLIC BLOOD PRESSURE: 96 MMHG | RESPIRATION RATE: 16 BRPM | TEMPERATURE: 97.2 F | DIASTOLIC BLOOD PRESSURE: 70 MMHG

## 2024-09-19 DIAGNOSIS — E11.621 TYPE 2 DIABETES MELLITUS WITH FOOT ULCER, WITHOUT LONG-TERM CURRENT USE OF INSULIN (HCC): ICD-10-CM

## 2024-09-19 DIAGNOSIS — L97.412 DIABETIC ULCER OF RIGHT HEEL ASSOCIATED WITH TYPE 2 DIABETES MELLITUS, WITH FAT LAYER EXPOSED (HCC): ICD-10-CM

## 2024-09-19 DIAGNOSIS — L97.509 TYPE 2 DIABETES MELLITUS WITH FOOT ULCER, WITHOUT LONG-TERM CURRENT USE OF INSULIN (HCC): ICD-10-CM

## 2024-09-19 DIAGNOSIS — E11.621 DIABETIC ULCER OF RIGHT HEEL ASSOCIATED WITH TYPE 2 DIABETES MELLITUS, WITH FAT LAYER EXPOSED (HCC): ICD-10-CM

## 2024-09-19 DIAGNOSIS — F17.210 CIGARETTE NICOTINE DEPENDENCE WITHOUT COMPLICATION: ICD-10-CM

## 2024-09-19 PROCEDURE — 11042 DBRDMT SUBQ TIS 1ST 20SQCM/<: CPT

## 2024-09-19 PROCEDURE — 3078F DIAST BP <80 MM HG: CPT | Performed by: NURSE PRACTITIONER

## 2024-09-19 PROCEDURE — 11042 DBRDMT SUBQ TIS 1ST 20SQCM/<: CPT | Performed by: NURSE PRACTITIONER

## 2024-09-19 PROCEDURE — 3074F SYST BP LT 130 MM HG: CPT | Performed by: NURSE PRACTITIONER

## 2024-09-19 PROCEDURE — 99214 OFFICE O/P EST MOD 30 MIN: CPT

## 2024-09-19 NOTE — PROGRESS NOTES
Provider Encounter- Diabetic Foot Ulcer      HISTORY OF PRESENT ILLNESS  Wound History:    START OF CARE IN CLINIC: 9/19/2024    REFERRING PROVIDER: Vera Quijano     WOUND- Diabetic foot ulcer   LOCATION: Left plantar heel     HISTORY: Patient developed blisters to her left foot following ill fitting shoes and excessive walking beginning of August 2024.  She followed up with the ED.  X-ray was negative for osteomyelitis.  She was started on Keflex and Bactrim DS antibiotics for 1 week which patient completed.  She was referred to [facility] wound care clinic for further treatment and care.  Of note patient has followed up with [facility] ED 9 times since 7/14/2024.    Pertinent Medical History: Deafness, diabetes, homelessness    DIABETES HX: Diagnosed with type 2 diabetes in 2020, and is currently managing diabetes with orals.    Checks blood sugars: 1x/day   Blood sugar average: 150-250  Has had previous diabetes education.    Does not have numbness in feet.    Foot wear:nonprescriptive shoes.  Does check feet routinely.    Previous foot ulcers: Yes  Previous foot surgery:no  Current occupation not working.    Offloading none.           TOBACCO USE:   1 pack/day cigarettes    Patient's problem list, allergies, and current medications reviewed and updated in Epic    Interval History:  9/19/2024: Initial wound evaluation, initial provider Clinic visit with Tata BURCIAGA, ALLA, MACKENZIE, NADIRA.  Pt denies fevers, chills, nausea, vomiting.  Interpretive services for sign language utilized via iPad.  All questions and concerns answered.  Patient to perform daily Dakin's moist dressing changes.  Currently staying at our place.  Rx given to patient to [facility] for offloading shoe.        REVIEW OF SYSTEMS:   Review of Systems   Constitutional:  Negative for chills, diaphoresis and fever.   HENT:          Deaf   Eyes: Negative.    Respiratory:  Negative for cough, shortness of breath and wheezing.     Cardiovascular:  Negative for chest pain, palpitations and claudication.   Gastrointestinal:  Negative for nausea and vomiting.   Musculoskeletal:  Negative for back pain, falls and joint pain.   Skin:  Negative for itching and rash.        Left foot wound   Neurological:  Negative for weakness.   Psychiatric/Behavioral:  Negative for depression. The patient is not nervous/anxious.        PHYSICAL EXAMINATION:   BP 96/70   Pulse 82   Temp 36.2 °C (97.2 °F) (Temporal)   Resp 16     Physical Exam  Vitals reviewed.   Cardiovascular:      Rate and Rhythm: Normal rate.      Pulses: Normal pulses.      Comments: Palpable pedal pulses bilaterally  Pulmonary:      Effort: Pulmonary effort is normal.   Skin:     Comments: Left plantar heel: Full-thickness, thick periwound callus, 100% wet slough, mild malodorous, no erythema, no edema, tender with light palpation    Left medial heel: Resolved    Scattered thin calluses to bilateral forefoot   Neurological:      General: No focal deficit present.      Mental Status: She is alert.      Comments:  Monofilament test completed.  Patient able to sense 10 out of 10 points to bilateral foot         Monofilament testing with a 10 gram force: sensation intact: intact bilaterally  Visual Inspection: Feet with maceration, ulcers, fissures.  Pedal pulses: intact bilaterally     WOUND ASSESSMENT  Wound Diabetic Ulcer Heel Plantar Left --Left Plantar Heel (Active)   Wound Image    09/19/24 0940   Site Assessment Other (Comment) 09/19/24 0940   Periwound Assessment Callused 09/19/24 0940   Margins Attached edges 09/19/24 0940   Drainage Amount Moderate 09/19/24 0940   Drainage Description Serosanguineous 09/19/24 0940   Treatments Cleansed;Topical Lidocaine;Provider debridement 09/19/24 0940   Wound Cleansing Hypochlorus Acid 09/19/24 0940   Periwound Protectant Not Applicable 09/19/24 0940   Dressing Changed New 09/19/24 0940   Dressing Cleansing/Solutions 1/4 Strength Dakin's  "Solution 09/19/24 0940   Dressing Options Moist Roll Gauze;Dry Gauze;Dry Roll Gauze;Hypafix Tape 09/19/24 0940   Dressing Change/Treatment Frequency Daily, and As Needed 09/19/24 0940   Non-staged Wound Description Full thickness 09/19/24 0940   Post-Procedure Length (cm) 1.2 cm 09/19/24 0940   Post-Procedure Width (cm) 1.7 cm 09/19/24 0940   Post-Procedure Depth (cm) 1 cm 09/19/24 0940   Post-Procedure Surface Area (cm^2) 2.04 cm^2 09/19/24 0940   Post-Procedure Volume (cm^3) 2.04 cm^3 09/19/24 0940   Tunneling (cm) 0 cm 09/19/24 0940   Undermining (cm) 0 cm 09/19/24 0940   Wound Odor None 09/19/24 0940   Pulses DP;2+ 09/19/24 0940   Right Foot Monofilament 10-point exam (Sensate) 10/10 09/19/24 0940   Left Foot Monofilament 10-point exam (Sensate) 10/10 09/19/24 0940   Exposed Structures JERSON 09/19/24 0940   Number of days:          PROCEDURE:   -2% viscous lidocaine applied topically to wound bed for approximately 5 minutes prior to debridement  -Curette used to debride wound bed and periwound callus debrided with scissors and forceps.  Excisional debridement was performed to remove devitalized tissue until healthy, bleeding tissue was visualized.   Entire surface of wound, 2.04 cm2 debrided.  Tissue debrided into the subcutaneous layer.  Periwound callus, ~ 10cm2, debrided to skin level, excising hyperkeratinized tissue.   -Bleeding controlled with manual pressure.    -Wound care completed by wound RN, refer to flowsheet  -Patient tolerated the procedure well, without c/o pain or discomfort.       Pertinent Labs and Diagnostics:         Labs:     A1c: No results found for: \"HBA1C\"       IMAGING: Foot/Toes Imaging, Past Year DX-FOOT-2- RIGHT    Result Date: 8/7/2024  Narrative 8/7/2024 2:41 PM HISTORY/REASON FOR EXAM:  Pain/Deformity Following Trauma; great toe. TECHNIQUE/EXAM DESCRIPTION AND NUMBER OF VIEWS:  2 views of the  RIGHT foot. COMPARISON: None FINDINGS: MINERALIZATION: Mineralization is unremarkable for " "age. INJURY: No acute fracture or gross malalignment is seen. JOINTS: No erosive arthropathy is evident.     Impression No radiographic evidence of acute traumatic injury.    Foot/Toes Imaging, Past Year DX-FOOT-COMPLETE 3+ LEFT    Result Date: 8/27/2024  Narrative 8/27/2024 5:07 PM HISTORY/REASON FOR EXAM:  infection medial heel TECHNIQUE/EXAM DESCRIPTION AND NUMBER OF VIEWS: 3 of the left foot COMPARISON: None. FINDINGS: There is normal bony mineralization.  There is no evidence of fracture, dislocation, or osseous lesion.  There is no evidence of soft tissue injury.     Impression 1.  No radiographic evidence of acute injury.      VASCULAR STUDIES: No results found.    LAST  WOUND CULTURE: No results found for: \"CULTRSULT\"        ASSESSMENT AND PLAN:     1. Diabetic ulcer of right heel associated with type 2 diabetes mellitus, with fat layer exposed ([facility])    9/19/2024: Initial evaluation.  Thick periwound callus with thick slough to plantar heel, mild odor  - Excisional debridement performed today.  Medically necessary to promote wound healing.  -Patient to follow-up at [facility] wound care clinic weekly for assessment, debridement  - Initiate Dakin's moist dressing changes.  Patient to change daily.  Supplies provided.  -Per chart review, patient completed antibiotics 9/3/2024.    Wound care: Dakin's moist roll gauze, gauze, roll gauze.  Change daily.    2. Type 2 diabetes mellitus with foot ulcer, without long-term current use of insulin ([facility])    9/19/2024: Patient checks blood sugars daily.  Today blood sugar 150.  -Does not have A1c noted in epic.  A1c ordered  - Rx given to patient to obtain offloading shoe through [facility]  - Discussed with patient importance of keeping blood sugar less than 140 for improved wound healing.  Verbalized understanding  - See education section below      3. Cigarette nicotine dependence without complication    9/19/2024: Patient smokes 1 pack/day.  Discuss with patient at next " visit if interested in smoking cessation.  -History of homelessness, currently living at our place            PATIENT EDUCATION  - Importance of tight glucose control for wound healing   - Implications of loss of protective sensation (LOPS) discussed with patient- including increased risk for amputation.  - Advised to check feet at least daily, moisturize feet, and to always wear protective foot wear.   -  Importance of offloading foot to assist with wound healing  - Advised pt not to trim nails or calluses, seek foot/nail care from podiatrist or certified foot/nail nurse  - Importance of adequate nutrition for wound healing    My total time spent caring for the patient on the day of the encounter was 30 minutes, reviewing history, assessment, counseling and education, and coordination of care.  This does not include time spent on separately billable procedures/tests.         Please note that this note may have been created using voice recognition software. I have worked with technical experts from [employer] to optimize the interface.  I have made every reasonable attempt to correct obvious errors, but there may be errors of grammar and possibly content that I did not discover before finalizing the note.    N     Self

## 2024-09-19 NOTE — PATIENT INSTRUCTIONS
-Keep dressings clean and dry. Change dressings once daily, and if the dressings become saturated, soiled, or fall off.    -Avoid prolonged standing or sitting without elevating your legs.    -Remove your compression garments if you have severe pain, severe swelling, numbness, color change, or temperature change in your toes. If you need to remove your compression garments, do so by unrolling them. Do not cut the compression garments off, this is to prevent cutting yourself on accident.    -Never walk around the house barefoot.    -Wear your offloading shoe any time you are up walking, even in your home.    -Should you experience any significant changes in your wound, such as signs of infection (increasing redness, swelling, localized heat, increased pain, fever > 101 F, chills) or have any questions regarding your home care instructions, please contact the wound center at [phone]. If after hours, contact your primary care physician or go to the hospital emergency room.     -If you are admitted to any hospital, you will need a new referral to come back to the wound clinic and any scheduled appointments that you already have, may be cancelled.     -If you are 5 or more minutes late for an appointment, we reserve the right to cancel and reschedule that appointment. Additionally, if you are habitually late or not showing (3 late cancellations and/or no shows), we reserve the right to cancel your remaining appointments and it will be your responsibility to obtain a new referral if services are still needed.

## 2024-09-20 ASSESSMENT — ENCOUNTER SYMPTOMS
COUGH: 0
CLAUDICATION: 0
SHORTNESS OF BREATH: 0
WHEEZING: 0
FEVER: 0
DIAPHORESIS: 0
ROS SKIN COMMENTS: LEFT FOOT WOUND
BACK PAIN: 0
FALLS: 0
PALPITATIONS: 0
WEAKNESS: 0
DEPRESSION: 0
EYES NEGATIVE: 1
NAUSEA: 0
VOMITING: 0
NERVOUS/ANXIOUS: 0
CHILLS: 0

## 2024-09-25 ENCOUNTER — NON-PROVIDER VISIT (OUTPATIENT)
Dept: WOUND CARE | Facility: MEDICAL CENTER | Age: 44
End: 2024-09-25
Attending: NURSE PRACTITIONER
Payer: MEDICARE

## 2024-09-25 PROCEDURE — 97597 DBRDMT OPN WND 1ST 20 CM/<: CPT

## 2024-09-25 NOTE — PROGRESS NOTES
CSWD with curette, forceps & scissors to remove non-viable slough from left plantar foot wound of <20cm2 after application of topical lidocaine for 5 minutes, patient tolerated well. Patient reports her blood sugars were 100 this morning. Changed dressing to hydrofiber silver dressing instead of Dakin's for less frequent dressing changes to optimize wound bed pH, temperature, and moisture levels for healing.

## 2024-09-25 NOTE — PATIENT INSTRUCTIONS
Avoid prolonged standing or sitting without elevating your legs.  - Apply tubigrip to your legs ending 2 fingers below back of knee without wrinkles.   - Remove if temperature or sensation changes.      Should you experience any significant changes in your wound(s), such as infection (redness, swelling, localized heat, increased pain, fever > 101 F, chills) or have any questions regarding your home care instructions, please contact the wound center at [phone]. If after hours, contact your primary care physician or go to the hospital emergency room.   Keep dressing clean, dry and covered while bathing. Only change dressing if it becomes over saturated, soiled or falls off or 1x/during the week.   Keep your blood sugars under tight control, stop using nicotine for your wound healing, increase your protein intake and drink your fluids. Wear your offloading shoe with every step you take.

## 2024-10-01 ENCOUNTER — NON-PROVIDER VISIT (OUTPATIENT)
Dept: WOUND CARE | Facility: MEDICAL CENTER | Age: 44
End: 2024-10-01
Attending: NURSE PRACTITIONER
Payer: MEDICAID

## 2024-10-01 PROCEDURE — 99211 OFF/OP EST MAY X REQ PHY/QHP: CPT

## 2024-10-08 ENCOUNTER — NON-PROVIDER VISIT (OUTPATIENT)
Dept: WOUND CARE | Facility: MEDICAL CENTER | Age: 44
End: 2024-10-08
Attending: NURSE PRACTITIONER
Payer: MEDICAID

## 2024-10-08 PROCEDURE — 97597 DBRDMT OPN WND 1ST 20 CM/<: CPT

## 2024-10-15 ENCOUNTER — NON-PROVIDER VISIT (OUTPATIENT)
Dept: WOUND CARE | Facility: MEDICAL CENTER | Age: 44
End: 2024-10-15
Attending: NURSE PRACTITIONER
Payer: MEDICAID

## 2024-10-15 PROCEDURE — 97597 DBRDMT OPN WND 1ST 20 CM/<: CPT

## 2024-11-05 ENCOUNTER — APPOINTMENT (OUTPATIENT)
Dept: WOUND CARE | Facility: MEDICAL CENTER | Age: 44
End: 2024-11-05
Attending: NURSE PRACTITIONER
Payer: MEDICAID

## 2024-11-12 ENCOUNTER — APPOINTMENT (OUTPATIENT)
Dept: WOUND CARE | Facility: MEDICAL CENTER | Age: 44
End: 2024-11-12
Attending: NURSE PRACTITIONER
Payer: MEDICAID

## 2024-11-19 ENCOUNTER — APPOINTMENT (OUTPATIENT)
Dept: WOUND CARE | Facility: MEDICAL CENTER | Age: 44
End: 2024-11-19
Attending: NURSE PRACTITIONER
Payer: MEDICAID

## 2024-11-26 ENCOUNTER — APPOINTMENT (OUTPATIENT)
Dept: WOUND CARE | Facility: MEDICAL CENTER | Age: 44
End: 2024-11-26
Attending: NURSE PRACTITIONER
Payer: MEDICAID